# Patient Record
Sex: MALE | HISPANIC OR LATINO | URBAN - NONMETROPOLITAN AREA
[De-identification: names, ages, dates, MRNs, and addresses within clinical notes are randomized per-mention and may not be internally consistent; named-entity substitution may affect disease eponyms.]

---

## 2019-12-10 ENCOUNTER — OFFICE VISIT (OUTPATIENT)
Dept: RETAIL CLINIC | Facility: CLINIC | Age: 29
End: 2019-12-10

## 2019-12-10 VITALS
HEART RATE: 76 BPM | OXYGEN SATURATION: 98 % | DIASTOLIC BLOOD PRESSURE: 80 MMHG | SYSTOLIC BLOOD PRESSURE: 120 MMHG | TEMPERATURE: 98 F | WEIGHT: 184 LBS | RESPIRATION RATE: 16 BRPM

## 2019-12-10 DIAGNOSIS — H65.92 LEFT OTITIS MEDIA WITH EFFUSION: Primary | ICD-10-CM

## 2019-12-10 DIAGNOSIS — J01.10 ACUTE FRONTAL SINUSITIS, RECURRENCE NOT SPECIFIED: ICD-10-CM

## 2019-12-10 PROCEDURE — 99213 OFFICE O/P EST LOW 20 MIN: CPT | Performed by: NURSE PRACTITIONER

## 2019-12-10 RX ORDER — METHYLPREDNISOLONE 4 MG/1
TABLET ORAL
Qty: 1 EACH | Refills: 0 | Status: SHIPPED | OUTPATIENT
Start: 2019-12-10

## 2019-12-10 RX ORDER — AMOXICILLIN AND CLAVULANATE POTASSIUM 875; 125 MG/1; MG/1
1 TABLET, FILM COATED ORAL EVERY 12 HOURS SCHEDULED
Qty: 20 TABLET | Refills: 0 | Status: SHIPPED | OUTPATIENT
Start: 2019-12-10 | End: 2019-12-20

## 2019-12-10 NOTE — PROGRESS NOTES
"Bruce Herndon is a 29 y.o. male.     Headache, left earache \"feels muffled\" ongoing for about 2 weeks.  Had been sick with upper respiratory symptoms prior to onset of symptoms.  Patient comprehends English, but family is there to help with interpretation.    Ear Fullness    There is pain in the left ear. This is a new problem. The current episode started 1 to 4 weeks ago. The problem occurs constantly. The problem has been unchanged. There has been no fever. The pain is at a severity of 4/10. The pain is mild. Associated symptoms include headaches and hearing loss. Pertinent negatives include no abdominal pain, coughing, diarrhea, ear discharge, neck pain, rash, rhinorrhea or sore throat. He has tried nothing for the symptoms.        The following portions of the patient's history were reviewed and updated as appropriate: allergies, current medications, past family history, past medical history, past social history, past surgical history and problem list.    Review of Systems   Constitutional: Negative for fever.   HENT: Positive for ear pain (sounds are muffled), hearing loss, postnasal drip and sinus pressure. Negative for ear discharge, rhinorrhea and sore throat.    Respiratory: Negative for cough.    Cardiovascular: Negative for chest pain.   Gastrointestinal: Negative for abdominal pain and diarrhea.   Musculoskeletal: Negative for neck pain and neck stiffness.   Skin: Negative for rash.   Neurological: Positive for headache. Negative for dizziness.       Objective      /80   Pulse 76   Temp 98 °F (36.7 °C)   Resp 16   Wt 83.5 kg (184 lb)   SpO2 98%     Physical Exam   Constitutional: He is oriented to person, place, and time. He appears well-developed and well-nourished. No distress.   HENT:   Head: Normocephalic and atraumatic.   Right Ear: Hearing, tympanic membrane, external ear and ear canal normal.   Left Ear: Hearing and external ear normal. Tympanic membrane is retracted. A " middle ear effusion is present.   Nose: Right sinus exhibits frontal sinus tenderness. Left sinus exhibits frontal sinus tenderness.   Mouth/Throat: Posterior oropharyngeal erythema present.       Eyes: Pupils are equal, round, and reactive to light. Conjunctivae and EOM are normal.   Neck: Normal range of motion. Neck supple.   Cardiovascular: Normal rate and regular rhythm.   Pulmonary/Chest: Effort normal and breath sounds normal.   Musculoskeletal: Normal range of motion.   Neurological: He is alert and oriented to person, place, and time.   Skin: Skin is warm and dry. Capillary refill takes less than 2 seconds.   Psychiatric: He has a normal mood and affect. His behavior is normal. Judgment and thought content normal.   Nursing note and vitals reviewed.        Assessment/Plan   Bora was seen today for earache.    Diagnoses and all orders for this visit:    Left otitis media with effusion    Acute frontal sinusitis, recurrence not specified    Other orders  -     amoxicillin-clavulanate (AUGMENTIN) 875-125 MG per tablet; Take 1 tablet by mouth Every 12 (Twelve) Hours for 10 days.  -     methylPREDNISolone (MEDROL, ZORAN,) 4 MG tablet; Take as directed on package instructions.    Advised OTC flonase to help with eustachian tube dysfunction. Also discussed potential for middle ear effusions to linger for several months. Return to see your Primary Care Provider if not improved in 1 week for recheck.    ZACH Jones    NOTE:  Patient is self pay

## 2019-12-10 NOTE — PATIENT INSTRUCTIONS
Otitis media en el adulto  Otitis Media, Adult    Se llama otitis media a la inflamación y la acumulación de líquido en el oído medio. El oído medio es la parte del oído que contiene los huesos de la audición, así hoang el aire que ayuda a enviar los sonidos al cerebro.  ¿Cuáles son las causas?  Esta afección es consecuencia de thien obstrucción de la trompa de Greg. Iraida conducto drena líquido del oído a la parte posterior de la nariz (nasofaringe). Un objeto o la hinchazón (edema) del conducto podrían provocar la obstrucción de iraida. Algunos de los problemas que pueden causar thien obstrucción:  · Resfriados u otra infección de las vías respiratorias superiores.  · Alergias.  · Un irritante, hoang el humo del tabaco.  · Hipertrofia de adenoides. Las adenoides son zonas de tejido blando ubicadas en la parte posterior de la garganta, detrás de la nariz y en el paladar.  · Un bulto en la nasofaringe.  · Daño en el oído a causa de cambios de presión (barotraumatismo).  ¿Cuáles son los signos o los síntomas?  Los síntomas de esta afección incluyen lo siguiente:  · Dolor de oído.  · Fiebre.  · Disminución de la audición.  · Dolor de radha.  · Cansancio (letargo).  · Supuración de líquido por el oído.  · Zumbidos en el oído.  ¿Cómo se diagnostica?  Esta afección se diagnostica mediante un examen físico. Adrián el examen, con un instrumento llamado otoscopio, el médico mirará dentro del oído para detectar enrojecimiento, hinchazón y presencia de líquido. También le preguntará acerca de rick síntomas.  El médico también puede indicarle estudios, hoang:  · Estudio para controlar el movimiento del tímpano (otoscopia neumática). Se realiza introduciendo thien pequeña cantidad de aire en el oído.  · Estudio que cambia la presión del aire del oído medio para controlar el modo en que el tímpano se mueve y si la trompa de Greg funciona (timpanograma).  ¿Cómo se trata?  Por lo general, esta afección desaparece sin tratamiento en  el transcurso de 3 a 5 días. Sin embargo, si la causa de la afección es thien infección bacteriana y no desaparece sin tratamiento, o si vuelve a aparecer más de thien vez, el médico podría realizar lo siguiente:  · Recetarle antibióticos para tratar la infección.  · Recetarle o recomendarle medicamentos para controlar el dolor.  Siga estas instrucciones en espinoza casa:  · Vinton los medicamentos de venta trung y los recetados solamente hoang se lo haya indicado el médico.  · Si le recetaron un antibiótico, tómelo hoang se lo haya indicado el médico. No deje de isma los antibióticos aunque comience a sentirse mejor.  · Concurra a todas las visitas de control hoang se lo haya indicado el médico. [place] es importante.  Comuníquese con un médico si:  · Le sangra la nariz.  · Tiene un bulto en el armin.  · No mejora luego de 5 herbert.  · Empeora en lugar de mejorar.  Solicite ayuda de inmediato si:  · Tiene dolor intenso que no puede controlar con medicamentos.  · Tiene hinchazón, enrojecimiento o dolor en el oído.  · Siente rigidez en el armin.  · Thien parte de espinoza megha se paraliza.  · Le duele el hueso que se encuentra detrás del oído (mastoides) al tocarlo.  · Dolor de radha intenso.  Resumen  · Se llama otitis media al enrojecimiento, el dolor y la hinchazón del oído medio.  · Por lo general, esta afección desaparece sin tratamiento en el transcurso de 3 a 5 días.  · Si el problema no desaparece en el término de 3 a 5 días, el médico podría prescribirle o recomendarle medicamentos para tratar los síntomas.  · Si le recetaron un antibiótico, tómelo hoang se lo haya indicado el médico.  Esta información no tiene hoang fin reemplazar el consejo del médico. Asegúrese de hacerle al médico cualquier pregunta que tenga.  Document Released: 09/27/2006 Document Revised: 04/27/2018 Document Reviewed: 07/15/2014  [employer] Interactive Patient Education © 2019 [employer]  Eustachian Tube Dysfunction       Eustachian tube dysfunction refers  to a condition in which a blockage develops in the narrow passage that connects the middle ear to the back of the nose (eustachian tube). The eustachian tube regulates air pressure in the middle ear by letting air move between the ear and nose. It also helps to drain fluid from the middle ear space.  Eustachian tube dysfunction can affect one or both ears. When the eustachian tube does not function properly, air pressure, fluid, or both can build up in the middle ear.  What are the causes?  This condition occurs when the eustachian tube becomes blocked or cannot open normally. Common causes of this condition include:  · Ear infections.  · Colds and other infections that affect the nose, mouth, and throat (upper respiratory tract).  · Allergies.  · Irritation from cigarette smoke.  · Irritation from stomach acid coming up into the esophagus (gastroesophageal reflux). The esophagus is the tube that carries food from the mouth to the stomach.  · Sudden changes in air pressure, such as from descending in an airplane or scuba diving.  · Abnormal growths in the nose or throat, such as:  ? Growths that line the nose (nasal polyps).  ? Abnormal growth of cells (tumors).  ? Enlarged tissue at the back of the throat (adenoids).  What increases the risk?  You are more likely to develop this condition if:  · You smoke.  · You are overweight.  · You are a child who has:  ? Certain birth defects of the mouth, such as cleft palate.  ? Large tonsils or adenoids.  What are the signs or symptoms?  Common symptoms of this condition include:  · A feeling of fullness in the ear.  · Ear pain.  · Clicking or popping noises in the ear.  · Ringing in the ear.  · Hearing loss.  · Loss of balance.  · Dizziness.  Symptoms may get worse when the air pressure around you changes, such as when you travel to an area of high elevation, fly on an airplane, or go scuba diving.  How is this diagnosed?  This condition may be diagnosed based on:  · Your  "symptoms.  · A physical exam of your ears, nose, and throat.  · Tests, such as those that measure:  ? The movement of your eardrum (tympanogram).  ? Your hearing (audiometry).  How is this treated?  Treatment depends on the cause and severity of your condition.  · In mild cases, you may relieve your symptoms by moving air into your ears. This is called \"popping the ears.\"  · In more severe cases, or if you have symptoms of fluid in your ears, treatment may include:  ? Medicines to relieve congestion (decongestants).  ? Medicines that treat allergies (antihistamines).  ? Nasal sprays or ear drops that contain medicines that reduce swelling (steroids).  ? A procedure to drain the fluid in your eardrum (myringotomy). In this procedure, a small tube is placed in the eardrum to:  § Drain the fluid.  § Restore the air in the middle ear space.  ? A procedure to insert a balloon device through the nose to inflate the opening of the eustachian tube (balloon dilation).  Follow these instructions at home:  Lifestyle  · Do not do any of the following until your health care provider approves:  ? Travel to high altitudes.  ? Fly in airplanes.  ? Work in a pressurized cabin or room.  ? Scuba dive.  · Do not use any products that contain nicotine or tobacco, such as cigarettes and e-cigarettes. If you need help quitting, ask your health care provider.  · Keep your ears dry. Wear fitted earplugs during showering and bathing. Dry your ears completely after.  General instructions  · Take over-the-counter and prescription medicines only as told by your health care provider.  · Use techniques to help pop your ears as recommended by your health care provider. These may include:  ? Chewing gum.  ? Yawning.  ? Frequent, forceful swallowing.  ? Closing your mouth, holding your nose closed, and gently blowing as if you are trying to blow air out of your nose.  · Keep all follow-up visits as told by your health care provider. This is " important.  Contact a health care provider if:  · Your symptoms do not go away after treatment.  · Your symptoms come back after treatment.  · You are unable to pop your ears.  · You have:  ? A fever.  ? Pain in your ear.  ? Pain in your head or neck.  ? Fluid draining from your ear.  · Your hearing suddenly changes.  · You become very dizzy.  · You lose your balance.  Summary  · Eustachian tube dysfunction refers to a condition in which a blockage develops in the eustachian tube.  · It can be caused by ear infections, allergies, inhaled irritants, or abnormal growths in the nose or throat.  · Symptoms include ear pain, hearing loss, or ringing in the ears.  · Mild cases are treated with maneuvers to unblock the ears, such as yawning or ear popping.  · Severe cases are treated with medicines. Surgery may also be done (rare).  This information is not intended to replace advice given to you by your health care provider. Make sure you discuss any questions you have with your health care provider.  Document Released: 01/13/2017 Document Revised: 04/09/2019 Document Reviewed: 04/09/2019  [employer] Interactive Patient Education © 2019 [employer]